# Patient Record
Sex: FEMALE | Race: WHITE | NOT HISPANIC OR LATINO | URBAN - METROPOLITAN AREA
[De-identification: names, ages, dates, MRNs, and addresses within clinical notes are randomized per-mention and may not be internally consistent; named-entity substitution may affect disease eponyms.]

---

## 2017-03-24 ENCOUNTER — LAB REQUISITION (OUTPATIENT)
Dept: LAB | Facility: HOSPITAL | Age: 27
End: 2017-03-24

## 2017-03-24 DIAGNOSIS — N80.9 ENDOMETRIOSIS: ICD-10-CM

## 2017-03-24 PROCEDURE — 88307 TISSUE EXAM BY PATHOLOGIST: CPT | Performed by: SURGERY

## 2017-03-27 LAB
CYTO UR: NORMAL
LAB AP CASE REPORT: NORMAL
LAB AP CLINICAL INFORMATION: NORMAL
Lab: NORMAL
PATH REPORT.FINAL DX SPEC: NORMAL
PATH REPORT.GROSS SPEC: NORMAL

## 2024-01-09 ENCOUNTER — OFFICE VISIT (OUTPATIENT)
Dept: OBSTETRICS AND GYNECOLOGY | Facility: CLINIC | Age: 34
End: 2024-01-09
Payer: COMMERCIAL

## 2024-01-09 VITALS
SYSTOLIC BLOOD PRESSURE: 100 MMHG | HEIGHT: 64 IN | BODY MASS INDEX: 25.27 KG/M2 | WEIGHT: 148 LBS | DIASTOLIC BLOOD PRESSURE: 60 MMHG

## 2024-01-09 DIAGNOSIS — N83.201 RIGHT OVARIAN CYST: ICD-10-CM

## 2024-01-09 DIAGNOSIS — R10.2 PELVIC PAIN: Primary | ICD-10-CM

## 2024-01-09 DIAGNOSIS — R10.32 LLQ ABDOMINAL PAIN: ICD-10-CM

## 2024-01-09 PROBLEM — N80.9 ENDOMETRIOSIS: Status: ACTIVE | Noted: 2024-01-09

## 2024-01-09 PROCEDURE — 99203 OFFICE O/P NEW LOW 30 MIN: CPT | Performed by: NURSE PRACTITIONER

## 2024-01-09 RX ORDER — LEVOCETIRIZINE DIHYDROCHLORIDE 5 MG/1
TABLET ORAL
COMMUNITY

## 2024-01-09 RX ORDER — RISANKIZUMAB-RZAA 150 MG/ML
INJECTION SUBCUTANEOUS
COMMUNITY
Start: 2024-01-04

## 2024-01-09 RX ORDER — ESOMEPRAZOLE MAGNESIUM 40 MG/1
CAPSULE, DELAYED RELEASE ORAL
COMMUNITY

## 2024-01-09 NOTE — PROGRESS NOTES
Pelvic Pain (LLQ pain)        MARCELO Barth is a 33 y.o. female, , who presents for initial evaluation evaluation of pelvic pain.      The pain is located in the left lower quadrant and it does not radiate. She has experienced this problem for 6 months, intermittently. She describes the pain as aching, pressure and stabbing and it occurs consistently when present. She rates her pain score as a 4/10 when at its worst. Patient denies aggravating factors. Ibuprofen helps minimally.  The patient reports additional symptoms as none.  The patient has previously been evaluated for pelvic pain. The patient is sexually active. She has not had new partners..    Did the patient have u/s today? Yes.  Findings showed IUD correctly placed, 2 cm right ovarian cyst with septations.  I have personally evaluated the U/S and agree with the findings. MARA Bhatt    Patient has mirena insertion completed 2019.  She reports history of surgery for endometriosis in approximately .    Periods are  rare, and consist of spotting, secondary to Mirena.    Problems with GI: yes - intermittent constipation   She reports that her last bowel movement was  morning.   History of urinary disease: no  Concern for Anxiety or Depression: no  Exercises Regularly: no  Tobacco Usage?: No     Additional OB/GYN History   Last Pap : 2020- HPV regardless- negative.  Has annual exam scheduled in 3/2024.  Last Completed Pap Smear       This patient has no relevant Health Maintenance data.            OB History          1    Para   1    Term   1            AB        Living   1         SAB        IAB        Ectopic        Molar        Multiple        Live Births                      Current Outpatient Medications:     esomeprazole (nexIUM) 40 MG capsule, , Disp: , Rfl:     sertraline (ZOLOFT) 50 MG tablet, , Disp: , Rfl:     Skyrizi Pen 150 MG/ML solution auto-injector, , Disp: , Rfl:     Xyzal  "Allergy 24HR 5 MG tablet, , Disp: , Rfl:      Past Medical History:   Diagnosis Date    Coronary artery disease 2016    Open heart surgery to repair VSD    Endometriosis May 2010    Surgery    Ovarian cyst [id number]        Past Surgical History:   Procedure Laterality Date     SECTION  2012    WISDOM TOOTH EXTRACTION         The additional following portions of the patient's history were reviewed and updated as appropriate: allergies, current medications, past family history, past medical history, past social history, past surgical history, and problem list.      Review of Systems   Constitutional: Negative.  Negative for fever.   Gastrointestinal:  Positive for abdominal pain and constipation. Negative for blood in stool, nausea and vomiting.   Genitourinary:  Positive for pelvic pain. Negative for difficulty urinating, dysuria and vaginal discharge.     All other systems reviewed and are negative.     I have reviewed and agree with the HPI, ROS, and historical information as entered above. Zo Childress, APRN      Objective   /60   Ht 162.6 cm (64\")   Wt 67.1 kg (148 lb)   LMP  (LMP Unknown)   BMI 25.40 kg/m²     Physical Exam  Constitutional:       Appearance: Normal appearance.   Pulmonary:      Effort: Pulmonary effort is normal.   Abdominal:      General: There is no distension.      Palpations: Abdomen is soft. There is no mass.      Tenderness: There is abdominal tenderness in the left lower quadrant. There is no guarding or rebound.      Hernia: No hernia is present.   Neurological:      Mental Status: She is alert.         Assessment & Plan     Assessment and Plan    Problem List Items Addressed This Visit          Genitourinary and Reproductive     Right ovarian cyst    Overview     2.5 cm right largely simple with septation. Repeat U/S 6 months          Other Visit Diagnoses       Pelvic pain    -  Primary    Relevant Orders    US Non-ob Transvaginal (Completed)    " LLQ abdominal pain              This pain has been occurring intermittently for several months now and will typically last 5-7 days and then resolve. This episode has been ongoing since Saturday. She notes 2 episodes of diarrhea on Sunday but the pain did not seem to correlate. She reports constipation with approximately 3 BM per week typically. We discussed this could be GI in nature or could be endometriosis related, U/S cannot differentiate. Her U/S today was unremarkable with IUD correctly placed and a 2 cm right cyst with septations. She has been  for 12 years, declines STD screen. Denies urinary issues. Tenderness on exam is located in the abdomen and not pelvic at this time. Patient in no acute distress and she reports ibuprofen provides some relief.     To ER for severe pain or fever  Return for Annual physical LOS. Will move Annual up.  She will do miralax daily and re-evaluate/discuss with LOS at annual to determine next steps.    Zo Childress, APRN  01/09/2024

## 2024-02-02 ENCOUNTER — OFFICE VISIT (OUTPATIENT)
Dept: OBSTETRICS AND GYNECOLOGY | Facility: CLINIC | Age: 34
End: 2024-02-02
Payer: COMMERCIAL

## 2024-02-02 VITALS
RESPIRATION RATE: 18 BRPM | WEIGHT: 147 LBS | HEIGHT: 64 IN | BODY MASS INDEX: 25.1 KG/M2 | DIASTOLIC BLOOD PRESSURE: 80 MMHG | SYSTOLIC BLOOD PRESSURE: 106 MMHG

## 2024-02-02 DIAGNOSIS — Z12.39 ENCOUNTER FOR BREAST CANCER SCREENING USING NON-MAMMOGRAM MODALITY: ICD-10-CM

## 2024-02-02 DIAGNOSIS — N80.9 ENDOMETRIOSIS: ICD-10-CM

## 2024-02-02 DIAGNOSIS — R53.83 OTHER FATIGUE: ICD-10-CM

## 2024-02-02 DIAGNOSIS — Z01.419 ENCOUNTER FOR ANNUAL ROUTINE GYNECOLOGICAL EXAMINATION: Primary | ICD-10-CM

## 2024-02-02 NOTE — PROGRESS NOTES
Gynecologic Annual Exam Note        Gynecologic Exam        Subjective     HPI  Marty Barth is a 33 y.o.  female who presents for annual well woman exam as a established patient of practice who is new to me. There were no changes to her medical or surgical history since her last visit.. No LMP recorded (lmp unknown). (Menstrual status: Other). Her periods are absent secondary to birth control. The flow is absent but she might spot from time to time. She denies dysmenorrhea. Marital Status: .  She is sexually active. She has not had new partners.. STD testing recommendations have been explained to the patient and she does not desire STD testing.    The patient would like to discuss the following complaints today: extreme fatigue. Patient states she feels like she could sleep all the time even when she rest all night. Would like to check hormone levels.    Additional OB/GYN History   contraceptive methods: IUD.  Insertion date: 2019  Desires to:  is undecided about  contraception. She would like to be on birth control but doesn't know if she should continue IUD or change  Thromboembolic Disease: none  History of migraines: no  Age of menarche: 14    History of STD: no    Last Pap : . Results: negative. HPV: negative.   Last Completed Pap Smear       This patient has no relevant Health Maintenance data.             History of abnormal Pap smear: yes - unsure of results and year  Gardasil status:unsure if she received the vaccine  Family history of uterine, colon, breast, or ovarian cancer: yes - Paternal aunt has breast cancer, Maternal aunt had ovarian cancer  Performs monthly Self-Breast Exam: no  Exercises Regularly:yes  Feelings of Anxiety or Depression: no  Tobacco Usage?: No       Current Outpatient Medications:     esomeprazole (nexIUM) 40 MG capsule, , Disp: , Rfl:     sertraline (ZOLOFT) 50 MG tablet, , Disp: , Rfl:     Skyrizi Pen 150 MG/ML solution auto-injector, , Disp: , Rfl:  "    Xyzal Allergy 24HR 5 MG tablet, , Disp: , Rfl:      Patient denies the need for medication refills today.    OB History          1    Para   1    Term   1            AB        Living   1         SAB        IAB        Ectopic        Molar        Multiple        Live Births                    Health Maintenance   Topic Date Due    Annual Gynecologic Pelvic and Breast Exam  Never done    BMI FOLLOWUP  Never done    TDAP/TD VACCINES (1 - Tdap) Never done    INFLUENZA VACCINE  2023    COVID-19 Vaccine (3 - -24 season) 2023    HEPATITIS C SCREENING  Never done    ANNUAL PHYSICAL  Never done    PAP SMEAR  Never done    Pneumococcal Vaccine 0-64  Aged Out       Past Medical History:   Diagnosis Date    Coronary artery disease 2016    Open heart surgery to repair VSD    Endometriosis May 2010    Surgery    Ovarian cyst [id number]        Past Surgical History:   Procedure Laterality Date     SECTION  2012    WISDOM TOOTH EXTRACTION         The additional following portions of the patient's history were reviewed and updated as appropriate: allergies, current medications, past family history, past medical history, past social history, past surgical history, and problem list.    Review of Systems      I have reviewed and agree with the HPI, ROS, and historical information as entered above. Tammy Galeana MD          Objective   /80   Resp 18   Ht 162.6 cm (64.02\")   Wt 66.7 kg (147 lb)   LMP  (LMP Unknown)   BMI 25.22 kg/m²     Physical Exam  Vitals and nursing note reviewed. Exam conducted with a chaperone present.   Constitutional:       Appearance: She is well-developed.   HENT:      Head: Normocephalic and atraumatic.   Neck:      Thyroid: No thyroid mass or thyromegaly.   Cardiovascular:      Rate and Rhythm: Normal rate and regular rhythm.      Heart sounds: No murmur heard.  Pulmonary:      Effort: Pulmonary effort is normal. No retractions.      Breath " sounds: Normal breath sounds. No wheezing, rhonchi or rales.   Chest:      Chest wall: No mass or tenderness.   Breasts:     Right: Normal. No mass, nipple discharge, skin change or tenderness.      Left: Normal. No mass, nipple discharge, skin change or tenderness.   Abdominal:      General: Bowel sounds are normal.      Palpations: Abdomen is soft. Abdomen is not rigid. There is no mass.      Tenderness: There is no abdominal tenderness. There is no guarding.      Hernia: No hernia is present. There is no hernia in the left inguinal area.   Genitourinary:     Labia:         Right: No rash, tenderness or lesion.         Left: No rash, tenderness or lesion.       Vagina: Normal. No vaginal discharge or lesions.      Cervix: No cervical motion tenderness, discharge, lesion or cervical bleeding.      Uterus: Normal. Not enlarged, not fixed and not tender.       Adnexa:         Right: No mass or tenderness.          Left: No mass or tenderness.        Rectum: No external hemorrhoid.   Musculoskeletal:      Cervical back: Normal range of motion. No muscular tenderness.   Neurological:      Mental Status: She is alert and oriented to person, place, and time.   Psychiatric:         Behavior: Behavior normal.            Assessment and Plan    Problem List Items Addressed This Visit          Genitourinary and Reproductive     Endometriosis    Overview     Dx with ginny 2010. She also reports she had endo removed from muscle/abdominal wall at some point following her C/S           Other Visit Diagnoses       Encounter for annual routine gynecological examination    -  Primary    Relevant Orders    LIQUID-BASED PAP SMEAR WITH HPV GENOTYPING REGARDLESS OF INTERPRETATION ([facility])    Thyroid Antibodies    CBC (No Diff)    Comprehensive Metabolic Panel    Hemoglobin A1c    T4, Free    TSH    Vitamin D,25-Hydroxy    MADELAINE    Vitamin B12    Folate    Ferritin    Encounter for breast cancer screening using non-mammogram modality         Other fatigue        Relevant Orders    Thyroid Antibodies    CBC (No Diff)    Comprehensive Metabolic Panel    Hemoglobin A1c    T4, Free    TSH    Vitamin D,25-Hydroxy    MADELAINE    Vitamin B12    Folate    Ferritin            GYN annual well woman exam.   Reviewed pap guidelines.   Recommended use of Vitamin D replacement and getting adequate calcium in her diet. (1500mg)  Reviewed monthly self breast exams.  Instructed to call with lumps, pain, or breast discharge.    Reviewed HPV guidelines.  Reviewed exercise as a preventative health measures.   Fatigue - this is severe.  Will check labs and encouraged further evaluation with PCP if no answers.  Discussed IUD.   No endometriosis symptoms that are bothersome at present.    Tammy Galeana MD  02/02/2024

## 2024-02-05 LAB
25(OH)D3+25(OH)D2 SERPL-MCNC: 29.8 NG/ML (ref 30–100)
ALBUMIN SERPL-MCNC: 4.7 G/DL (ref 3.5–5.2)
ALBUMIN/GLOB SERPL: 2.1 G/DL
ALP SERPL-CCNC: 70 U/L (ref 39–117)
ALT SERPL-CCNC: 13 U/L (ref 1–33)
ANA SER QL: NEGATIVE
AST SERPL-CCNC: 13 U/L (ref 1–32)
BILIRUB SERPL-MCNC: 0.4 MG/DL (ref 0–1.2)
BUN SERPL-MCNC: 10 MG/DL (ref 6–20)
BUN/CREAT SERPL: 12.2 (ref 7–25)
CALCIUM SERPL-MCNC: 9.1 MG/DL (ref 8.6–10.5)
CHLORIDE SERPL-SCNC: 106 MMOL/L (ref 98–107)
CO2 SERPL-SCNC: 25.3 MMOL/L (ref 22–29)
CREAT SERPL-MCNC: 0.82 MG/DL (ref 0.57–1)
EGFRCR SERPLBLD CKD-EPI 2021: 97 ML/MIN/1.73
ERYTHROCYTE [DISTWIDTH] IN BLOOD BY AUTOMATED COUNT: 12.1 % (ref 12.3–15.4)
FERRITIN SERPL-MCNC: 64.7 NG/ML (ref 13–150)
FOLATE SERPL-MCNC: 8.05 NG/ML (ref 4.78–24.2)
GLOBULIN SER CALC-MCNC: 2.2 GM/DL
GLUCOSE SERPL-MCNC: 82 MG/DL (ref 65–99)
HBA1C MFR BLD: 5.1 % (ref 4.8–5.6)
HCT VFR BLD AUTO: 36.8 % (ref 34–46.6)
HGB BLD-MCNC: 12 G/DL (ref 12–15.9)
MCH RBC QN AUTO: 28.8 PG (ref 26.6–33)
MCHC RBC AUTO-ENTMCNC: 32.6 G/DL (ref 31.5–35.7)
MCV RBC AUTO: 88.5 FL (ref 79–97)
PLATELET # BLD AUTO: 271 10*3/MM3 (ref 140–450)
POTASSIUM SERPL-SCNC: 4 MMOL/L (ref 3.5–5.2)
PROT SERPL-MCNC: 6.9 G/DL (ref 6–8.5)
RBC # BLD AUTO: 4.16 10*6/MM3 (ref 3.77–5.28)
SODIUM SERPL-SCNC: 141 MMOL/L (ref 136–145)
T4 FREE SERPL-MCNC: 1.18 NG/DL (ref 0.93–1.7)
THYROGLOB AB SERPL-ACNC: <1 IU/ML (ref 0–0.9)
THYROPEROXIDASE AB SERPL-ACNC: <9 IU/ML (ref 0–34)
TSH SERPL DL<=0.005 MIU/L-ACNC: 0.82 UIU/ML (ref 0.27–4.2)
VIT B12 SERPL-MCNC: 291 PG/ML (ref 211–946)
WBC # BLD AUTO: 5.69 10*3/MM3 (ref 3.4–10.8)

## 2024-02-08 LAB — REF LAB TEST METHOD: NORMAL
